# Patient Record
Sex: FEMALE | Race: WHITE | Employment: FULL TIME | URBAN - METROPOLITAN AREA
[De-identification: names, ages, dates, MRNs, and addresses within clinical notes are randomized per-mention and may not be internally consistent; named-entity substitution may affect disease eponyms.]

---

## 2020-07-01 VITALS
BODY MASS INDEX: 34.91 KG/M2 | SYSTOLIC BLOOD PRESSURE: 148 MMHG | WEIGHT: 197 LBS | HEIGHT: 63 IN | DIASTOLIC BLOOD PRESSURE: 100 MMHG

## 2020-07-01 PROBLEM — Z87.410 HISTORY OF CERVICAL DYSPLASIA: Status: ACTIVE | Noted: 2020-07-01

## 2020-07-01 PROBLEM — E07.9 DISORDER OF THYROID: Status: ACTIVE | Noted: 2020-07-01

## 2020-07-01 PROBLEM — G43.909 MIGRAINE: Status: ACTIVE | Noted: 2020-07-01

## 2020-07-01 PROBLEM — E66.9 OBESITY: Status: ACTIVE | Noted: 2020-07-01

## 2020-07-01 PROBLEM — E78.00 HYPERCHOLESTEROLEMIA: Status: ACTIVE | Noted: 2020-07-01

## 2020-07-01 RX ORDER — RIZATRIPTAN BENZOATE 10 MG/1
10 TABLET ORAL
COMMUNITY
End: 2022-07-13 | Stop reason: SDUPTHER

## 2020-07-01 RX ORDER — LEVOTHYROXINE SODIUM 112 UG/1
TABLET ORAL
COMMUNITY

## 2020-07-01 RX ORDER — SIMVASTATIN 20 MG/1
TABLET, FILM COATED ORAL
COMMUNITY

## 2021-07-01 VITALS — HEIGHT: 63 IN | BODY MASS INDEX: 34.9 KG/M2

## 2021-07-08 ENCOUNTER — OFFICE VISIT (OUTPATIENT)
Dept: OBGYN CLINIC | Age: 61
End: 2021-07-08
Payer: COMMERCIAL

## 2021-07-08 VITALS
WEIGHT: 203 LBS | BODY MASS INDEX: 35.97 KG/M2 | SYSTOLIC BLOOD PRESSURE: 134 MMHG | HEIGHT: 63 IN | DIASTOLIC BLOOD PRESSURE: 82 MMHG

## 2021-07-08 DIAGNOSIS — Z01.419 ROUTINE GYNECOLOGICAL EXAMINATION: ICD-10-CM

## 2021-07-08 DIAGNOSIS — N81.11 CYSTOCELE, MIDLINE: ICD-10-CM

## 2021-07-08 DIAGNOSIS — N95.1 MENOPAUSAL SYNDROME: ICD-10-CM

## 2021-07-08 DIAGNOSIS — Z12.4 SCREENING FOR MALIGNANT NEOPLASM OF CERVIX: Primary | ICD-10-CM

## 2021-07-08 PROCEDURE — 99396 PREV VISIT EST AGE 40-64: CPT | Performed by: OBSTETRICS & GYNECOLOGY

## 2021-07-08 NOTE — PROGRESS NOTES
Chief Complaint   [facility] with   [facility]     Gaojg-0-/scheduled 7-28-21, Colonoscopy-, [id number]     Visit Vitals  /82 (BP 1 Location: Left upper arm, BP Patient Position: Sitting, BP Cuff Size: Small adult)   Ht 5' 3\" (1.6 m)   Wt 203 lb (92.1 kg)   BMI 35.96 kg/m²

## 2021-07-08 NOTE — PROGRESS NOTES
Henri Fuentes is a , 61 y.o. female   No LMP recorded. Patient is postmenopausal.    She presents for her annual    She is having notes some vaginal pressure, no UI sxs. Menstrual status:  Cycles are menopausal.    Flow: absent. She does not have dysmenorrhea. Medical conditions:  Since her last annual GYN exam about one year ago, she has not the following changes in her health history: none. Mammogram History:    EDUARD Results (most recent):  No results found for this or any previous visit. DEXA Results (most recent):  No results found for this or any previous visit. Past Medical History:   Diagnosis Date    Abnormal Papanicolaou smear of cervix     Disorder of thyroid 2020    History of cervical dysplasia 2020    Hypercholesterolemia 2020    Migraine 2020    Obesity 2020     Past Surgical History:   Procedure Laterality Date    HX BILATERAL SALPINGECTOMY      HX COLONOSCOPY      HX COLPOSCOPY      HX GI  10/2020    colonoscopy    HX GYN      CRYOSURGERY OF CX    IR CHOLECYSTOSTOMY PERCUTANEOUS      [place] LAP,TUBAL CAUTERY         Prior to Admission medications    Medication Sig Start Date End Date Taking? Authorizing Provider   levothyroxine (SYNTHROID) 112 mcg tablet Take  by mouth Daily (before breakfast). Yes Provider, Historical   rizatriptan (MAXALT) 10 mg tablet Take 10 mg by mouth once as needed for Migraine. May repeat in 2 hours if needed   Yes Provider, Historical   simvastatin (ZOCOR) 20 mg tablet Take  by mouth nightly. Yes Provider, Historical   VALSARTAN PO Take  by mouth. Yes Provider, Historical       Allergies   Allergen Reactions    Sulfa (Sulfonamide Antibiotics) Rash          Tobacco History:  reports that she has never smoked. She has never used smokeless tobacco.  Alcohol Abuse:  reports previous alcohol use. Drug Abuse:  reports no history of drug use.     Family Medical/Cancer History:   Family History   Problem Relation Age of Onset    Diabetes Other     Lung Disease Other           Review of Systems   Constitutional: Negative for chills, fever, malaise/fatigue and weight loss. HENT: Negative for congestion, ear pain, sinus pain and tinnitus. Eyes: Negative for blurred vision and double vision. Respiratory: Negative for cough, shortness of breath and wheezing. Cardiovascular: Negative for chest pain and palpitations. Gastrointestinal: Negative for abdominal pain, blood in stool, constipation, diarrhea, heartburn, nausea and vomiting. Genitourinary: Negative for dysuria, flank pain, frequency, hematuria and urgency. Musculoskeletal: Negative for joint pain and myalgias. Skin: Negative for itching and rash. Neurological: Negative for dizziness, weakness and headaches. Psychiatric/Behavioral: Negative for depression, memory loss and suicidal ideas. The patient is not nervous/anxious and does not have insomnia. Physical Exam  Constitutional:       Appearance: Normal appearance. HENT:      Head: Normocephalic and atraumatic. Cardiovascular:      Rate and Rhythm: Normal rate. Heart sounds: Normal heart sounds. Pulmonary:      Effort: Pulmonary effort is normal.      Breath sounds: Normal breath sounds. Chest:      Breasts:         Right: Normal.         Left: Normal.   Abdominal:      General: Abdomen is flat. Palpations: Abdomen is soft. Genitourinary:     General: Normal vulva. Vagina: Prolapsed vaginal walls present. Cervix: Normal.      Uterus: Normal.       Adnexa: Right adnexa normal and left adnexa normal.      Rectum: Normal.      Comments: PAP Obtained  Midline cystocele  Neurological:      Mental Status: She is alert. Psychiatric:         Mood and Affect: Mood normal.         Behavior: Behavior normal.         Thought Content:  Thought content normal.          Visit Vitals  /82 (BP 1 Location: Left upper arm, BP Patient Position: Sitting, BP Cuff Size: Small adult)   Ht 5' 3\" (1.6 m)   Wt 203 lb (92.1 kg)   BMI 35.96 kg/m²         Assessment:   Diagnoses and all orders for this visit:    1. Screening for malignant neoplasm of cervix  -     PAP IG, RFX APTIMA HPV ASCUS ([id number])    2. Routine gynecological examination  -     PAP IG, RFX APTIMA HPV ASCUS ([id number])    3. Menopausal syndrome    4. Cystocele, midline    Kegel's info given to the pt    Plan: Questions addressed  Counseled re: diet, exercise, healthy lifestyle  Return for Annual  Rec annual mammogram      Follow-up and Dispositions    · Return for 1 yr annual, 1 yr mammo.

## 2021-07-10 LAB
CYTOLOGIST CVX/VAG CYTO: NORMAL
CYTOLOGY CVX/VAG DOC CYTO: NORMAL
CYTOLOGY CVX/VAG DOC THIN PREP: NORMAL
DX ICD CODE: NORMAL
LABCORP, 190119: NORMAL
Lab: NORMAL
OTHER STN SPEC: NORMAL
STAT OF ADQ CVX/VAG CYTO-IMP: NORMAL

## 2021-07-21 DIAGNOSIS — Z12.31 SCREENING MAMMOGRAM, ENCOUNTER FOR: Primary | ICD-10-CM

## 2021-08-16 DIAGNOSIS — Z12.31 SCREENING MAMMOGRAM, ENCOUNTER FOR: ICD-10-CM

## 2022-03-18 PROBLEM — Z87.410 HISTORY OF CERVICAL DYSPLASIA: Status: ACTIVE | Noted: 2020-07-01

## 2022-03-18 PROBLEM — E78.00 HYPERCHOLESTEROLEMIA: Status: ACTIVE | Noted: 2020-07-01

## 2022-03-19 PROBLEM — E66.9 OBESITY: Status: ACTIVE | Noted: 2020-07-01

## 2022-03-19 PROBLEM — G43.909 MIGRAINE: Status: ACTIVE | Noted: 2020-07-01

## 2022-03-19 PROBLEM — E07.9 DISORDER OF THYROID: Status: ACTIVE | Noted: 2020-07-01

## 2022-04-13 ENCOUNTER — OFFICE VISIT (OUTPATIENT)
Dept: OBGYN CLINIC | Age: 62
End: 2022-04-13
Payer: COMMERCIAL

## 2022-04-13 VITALS
WEIGHT: 201.13 LBS | HEIGHT: 63 IN | DIASTOLIC BLOOD PRESSURE: 74 MMHG | SYSTOLIC BLOOD PRESSURE: 126 MMHG | BODY MASS INDEX: 35.64 KG/M2

## 2022-04-13 DIAGNOSIS — N81.2 CYSTOCELE WITH SECOND DEGREE UTERINE PROLAPSE: Primary | ICD-10-CM

## 2022-04-13 PROCEDURE — 99214 OFFICE O/P EST MOD 30 MIN: CPT | Performed by: OBSTETRICS & GYNECOLOGY

## 2022-04-13 NOTE — PROGRESS NOTES
Fletcher Powell is a , 64 y.o. female   No LMP recorded. Patient is postmenopausal.    She presents for her problem    She is having increased pelvic pressure with a vaginal bulge noted, worse as day goes on or with inc. abdominal pressure. Menstrual status:  Cycles are menopausal.    Flow: absent. She does not have dysmenorrhea. Medical conditions:  Since her last annual GYN exam about one year ago, she has not the following changes in her health history: none. Mammogram History:    EDUARD Results (most recent):  Results from Orders Only encounter on 21    EDUARD 3D JESSENIA W MAMMO BI SCREENING INCL CAD       DEXA Results (most recent):  No results found for this or any previous visit. Past Medical History:   Diagnosis Date    Abnormal Papanicolaou smear of cervix     Disorder of thyroid 2020    History of cervical dysplasia 2020    Hypercholesterolemia 2020    Migraine 2020    Obesity 2020     Past Surgical History:   Procedure Laterality Date    HX BILATERAL SALPINGECTOMY      HX COLONOSCOPY      HX COLPOSCOPY      HX GI  10/2020    colonoscopy    HX GYN      CRYOSURGERY OF CX    IR CHOLECYSTOSTOMY PERCUTANEOUS      [place] LAP,TUBAL CAUTERY         Prior to Admission medications    Medication Sig Start Date End Date Taking? Authorizing Provider   levothyroxine (SYNTHROID) 112 mcg tablet Take  by mouth Daily (before breakfast). Yes Provider, Historical   rizatriptan (MAXALT) 10 mg tablet Take 10 mg by mouth once as needed for Migraine. May repeat in 2 hours if needed   Yes Provider, Historical   simvastatin (ZOCOR) 20 mg tablet Take  by mouth nightly. Yes Provider, Historical   VALSARTAN PO Take  by mouth. Yes Provider, Historical       Allergies   Allergen Reactions    Sulfa (Sulfonamide Antibiotics) Rash          Tobacco History:  reports that she has never smoked.  She has never used smokeless tobacco.  Alcohol use:  reports previous alcohol use.  Drug use:  reports no history of drug use. Family Medical/Cancer History:   Family History   Problem Relation Age of Onset    Diabetes Other     Lung Disease Other           Review of Systems   Constitutional: Negative for chills, fever, malaise/fatigue and weight loss. HENT: Negative for congestion, ear pain, sinus pain and tinnitus. Eyes: Negative for blurred vision and double vision. Respiratory: Negative for cough, shortness of breath and wheezing. Cardiovascular: Negative for chest pain and palpitations. Gastrointestinal: Negative for abdominal pain, blood in stool, constipation, diarrhea, heartburn, nausea and vomiting. Genitourinary: Negative for dysuria, flank pain, frequency, hematuria and urgency. Musculoskeletal: Negative for joint pain and myalgias. Skin: Negative for itching and rash. Neurological: Negative for dizziness, weakness and headaches. Psychiatric/Behavioral: Negative for depression, memory loss and suicidal ideas. The patient is not nervous/anxious and does not have insomnia. Physical Exam  Constitutional:       Appearance: Normal appearance. HENT:      Head: Normocephalic and atraumatic. Abdominal:      General: Abdomen is flat. Palpations: Abdomen is soft. Genitourinary:     General: Normal vulva. Vagina: Prolapsed vaginal walls present. Cervix: Normal.      Uterus: Normal. With uterine prolapse. Adnexa: Right adnexa normal and left adnexa normal.      Rectum: Normal.      Comments: No PAP obtained  Neurological:      Mental Status: She is alert. Psychiatric:         Mood and Affect: Mood normal.         Behavior: Behavior normal.         Thought Content:  Thought content normal.          Visit Vitals  /74 (BP 1 Location: Left upper arm, BP Patient Position: Sitting, BP Cuff Size: Large adult)   Ht 5' 3\" (1.6 m)   Wt 201 lb 2 oz (91.2 kg)   BMI 35.63 kg/m²         Assessment: Diagnoses and all orders for this visit: 1. Cystocele with second degree uterine prolapse    Anatomy DWP, options DWP- nothing, Pessary or LAVH/Anterior repair    Plan: Questions addressed, Info given to the pt.  Pt to call with desired option  Counseled re: diet, exercise, healthy lifestyle  Return for Annual  Rec annual mammogram

## 2022-04-13 NOTE — PROGRESS NOTES
Chief Complaint   Patient presents with    Vaginal Pain     Visit Vitals  /74 (BP 1 Location: Left upper arm, BP Patient Position: Sitting, BP Cuff Size: Large adult)   Ht 5' 3\" (1.6 m)   Wt 201 lb 2 oz (91.2 kg)   BMI 35.63 kg/m²

## 2022-07-13 ENCOUNTER — OFFICE VISIT (OUTPATIENT)
Dept: OBGYN CLINIC | Age: 62
End: 2022-07-13
Payer: COMMERCIAL

## 2022-07-13 VITALS
HEIGHT: 63 IN | BODY MASS INDEX: 35.08 KG/M2 | SYSTOLIC BLOOD PRESSURE: 122 MMHG | WEIGHT: 198 LBS | DIASTOLIC BLOOD PRESSURE: 72 MMHG

## 2022-07-13 DIAGNOSIS — Z01.419 ROUTINE GYNECOLOGICAL EXAMINATION: ICD-10-CM

## 2022-07-13 DIAGNOSIS — N95.1 MENOPAUSAL SYNDROME: ICD-10-CM

## 2022-07-13 DIAGNOSIS — Z12.4 SCREENING FOR MALIGNANT NEOPLASM OF CERVIX: Primary | ICD-10-CM

## 2022-07-13 DIAGNOSIS — N81.4 PELVIC RELAXATION DUE TO UTERINE PROLAPSE: ICD-10-CM

## 2022-07-13 PROCEDURE — 99396 PREV VISIT EST AGE 40-64: CPT | Performed by: OBSTETRICS & GYNECOLOGY

## 2022-07-13 RX ORDER — RIZATRIPTAN BENZOATE 10 MG/1
TABLET ORAL
Qty: 30 TABLET | Refills: 1 | Status: SHIPPED | OUTPATIENT
Start: 2022-07-13

## 2022-07-13 NOTE — PROGRESS NOTES
Chief Complaint   Patient presents with    Annual Exam     mammo 7-2021     Visit Vitals  /72 (BP 1 Location: Left upper arm, BP Patient Position: Sitting, BP Cuff Size: Small adult)   Ht 5' 3\" (1.6 m)   Wt 198 lb (89.8 kg)   BMI 35.07 kg/m²

## 2022-07-13 NOTE — PROGRESS NOTES
Alie Katz is a , 64 y.o. female   No LMP recorded. Patient is postmenopausal.    She presents for her annual    She is having no significant problems. Notes increasing pelvic discomfort and pressure      Menstrual status:  Cycles are menopausal.    Flow: absent. She does not have dysmenorrhea. Medical conditions:  Since her last annual GYN exam about one year ago, she has not the following changes in her health history: none. Mammogram History:    EDUARD Results (most recent):  Results from Orders Only encounter on 21    EDUARD 3D JESSENIA W MAMMO BI SCREENING INCL CAD       DEXA Results (most recent):  No results found for this or any previous visit. Past Medical History:   Diagnosis Date    Abnormal Papanicolaou smear of cervix     Disorder of thyroid 2020    History of cervical dysplasia 2020    Hypercholesterolemia 2020    Migraine 2020    Obesity 2020     Past Surgical History:   Procedure Laterality Date    HX BILATERAL SALPINGECTOMY      HX COLONOSCOPY      HX COLPOSCOPY      HX GI  10/2020    colonoscopy    HX GYN      CRYOSURGERY OF CX    IR CHOLECYSTOSTOMY PERCUTANEOUS      [place] LAP,TUBAL CAUTERY         Prior to Admission medications    Medication Sig Start Date End Date Taking? Authorizing Provider   rizatriptan (MAXALT) 10 mg tablet Take 1 tablet by mouth as needed, may repeat in 2 hours if needed  Indications: a migraine headache 22  Yes Diane Portillo MD   levothyroxine (SYNTHROID) 112 mcg tablet Take  by mouth Daily (before breakfast). Yes Provider, Historical   simvastatin (ZOCOR) 20 mg tablet Take  by mouth nightly. Yes Provider, Historical   VALSARTAN PO Take  by mouth. Yes Provider, Historical       Allergies   Allergen Reactions    Sulfa (Sulfonamide Antibiotics) Rash          Tobacco History:  reports that she has never smoked. She has never used smokeless tobacco.  Alcohol use:  reports previous alcohol use.   Drug use:  reports no history of drug use. Family Medical/Cancer History:   Family History   Problem Relation Age of Onset    Diabetes Other     Lung Disease Other           Review of Systems   Constitutional: Negative for chills, fever, malaise/fatigue and weight loss. HENT: Negative for congestion, ear pain, sinus pain and tinnitus. Eyes: Negative for blurred vision and double vision. Respiratory: Negative for cough, shortness of breath and wheezing. Cardiovascular: Negative for chest pain and palpitations. Gastrointestinal: Negative for abdominal pain, blood in stool, constipation, diarrhea, heartburn, nausea and vomiting. Genitourinary: Negative for dysuria, flank pain, frequency, hematuria and urgency. Musculoskeletal: Negative for joint pain and myalgias. Skin: Negative for itching and rash. Neurological: Negative for dizziness, weakness and headaches. Psychiatric/Behavioral: Negative for depression, memory loss and suicidal ideas. The patient is not nervous/anxious and does not have insomnia. Physical Exam  Constitutional:       Appearance: Normal appearance. HENT:      Head: Normocephalic and atraumatic. Cardiovascular:      Rate and Rhythm: Normal rate. Heart sounds: Normal heart sounds. Pulmonary:      Effort: Pulmonary effort is normal.      Breath sounds: Normal breath sounds. Chest:   Breasts:      Right: Normal.      Left: Normal.       Abdominal:      General: Abdomen is flat. Palpations: Abdomen is soft. Genitourinary:     General: Normal vulva. Vagina: Prolapsed vaginal walls present. Cervix: Normal.      Uterus: Normal. With uterine prolapse. Adnexa: Right adnexa normal and left adnexa normal.      Rectum: Normal.      Comments: PAP Obtained  Neurological:      Mental Status: She is alert. Psychiatric:         Mood and Affect: Mood normal.         Behavior: Behavior normal.         Thought Content:  Thought content normal. Visit Vitals  /72 (BP 1 Location: Left upper arm, BP Patient Position: Sitting, BP Cuff Size: Small adult)   Ht 5' 3\" (1.6 m)   Wt 198 lb (89.8 kg)   BMI 35.07 kg/m²         Assessment:   Diagnoses and all orders for this visit:    1. Screening for malignant neoplasm of cervix  -     PAP IG, RFX APTIMA HPV ASCUS ([id number])    2. Routine gynecological examination  -     PAP IG, RFX APTIMA HPV ASCUS ([id number])    3. Menopausal syndrome    4. Pelvic relaxation due to uterine prolapse    Other orders  -     rizatriptan (MAXALT) 10 mg tablet; Take 1 tablet by mouth as needed, may repeat in 2 hours if needed  Indications: a migraine headache    Options DWP- regarding prolapse- nothing, pessary, or Surgery  Pt to think about and will call back- will send to Urogyn given prolapse    Plan: Questions addressed  Counseled re: diet, exercise, healthy lifestyle  Return for Annual  Rec annual mammogram        Follow-up and Dispositions    · Return for Mammogram, 1 yr annual, 1 yr mammo.

## 2022-07-20 DIAGNOSIS — N81.89 PELVIC RELAXATION: Primary | ICD-10-CM

## 2022-07-20 DIAGNOSIS — Z12.31 SCREENING MAMMOGRAM, ENCOUNTER FOR: Primary | ICD-10-CM

## 2022-08-03 DIAGNOSIS — Z12.31 SCREENING MAMMOGRAM, ENCOUNTER FOR: ICD-10-CM

## 2023-05-21 RX ORDER — LEVOTHYROXINE SODIUM 112 UG/1
TABLET ORAL
COMMUNITY

## 2023-05-21 RX ORDER — SIMVASTATIN 20 MG
TABLET ORAL
COMMUNITY

## 2023-05-21 RX ORDER — RIZATRIPTAN BENZOATE 10 MG/1
TABLET ORAL
COMMUNITY
Start: 2022-07-13

## 2023-07-14 VITALS — BODY MASS INDEX: 35.07 KG/M2 | HEIGHT: 63 IN

## 2023-07-20 ENCOUNTER — OFFICE VISIT (OUTPATIENT)
Age: 63
End: 2023-07-20

## 2023-07-20 VITALS
BODY MASS INDEX: 34.22 KG/M2 | DIASTOLIC BLOOD PRESSURE: 72 MMHG | HEIGHT: 63 IN | WEIGHT: 193.13 LBS | SYSTOLIC BLOOD PRESSURE: 128 MMHG

## 2023-07-20 DIAGNOSIS — Z12.4 PAP SMEAR FOR CERVICAL CANCER SCREENING: ICD-10-CM

## 2023-07-20 DIAGNOSIS — Z01.419 GYNECOLOGIC EXAM NORMAL: ICD-10-CM

## 2023-07-20 DIAGNOSIS — N81.4 PELVIC RELAXATION DUE TO UTERINE PROLAPSE: ICD-10-CM

## 2023-07-20 DIAGNOSIS — N95.9 MENOPAUSAL PROBLEM: Primary | ICD-10-CM

## 2023-07-20 ASSESSMENT — ENCOUNTER SYMPTOMS
GASTROINTESTINAL NEGATIVE: 1
RESPIRATORY NEGATIVE: 1

## 2023-07-20 NOTE — PROGRESS NOTES
Toney Dave is a [id number], 58 y.o. female   No LMP recorded. (Menstrual status: Menopause). She presents for her annual    She is having no significant problems. Menstrual status:  Cycles are menopausal.    Flow: absent. She does not have dysmenorrhea. Medical conditions:  Since her last annual GYN exam about one year ago, she has not the following changes in her health history: none. Mammogram History:    JUDY Results (most recent):  @SwarmBuildILASTIMGCAT(AMX0422:1)@     DEXA Results (most recent):  @BSILASTIMGCAT(QVW5078:1)@       Past Medical History:   Diagnosis Date    Abnormal Papanicolaou smear of cervix     Disorder of thyroid 7/1/2020    History of cervical dysplasia 7/1/2020    Hypercholesterolemia 7/1/2020    Migraine 7/1/2020    Obesity 7/1/2020     Past Surgical History:   Procedure Laterality Date    COLONOSCOPY      COLPOSCOPY      GI  10/2020    colonoscopy    GYN      CRYOSURGERY OF CX    IR CHOLECYSTOSTOMY PERCUTANEOUS COMPLETE      LAP,TUBAL CAUTERY      SALPINGECTOMY         Prior to Admission medications    Medication Sig Start Date End Date Taking? Authorizing Provider   VALSARTAN PO Take by mouth   Yes Ar Automatic Reconciliation   levothyroxine (SYNTHROID) 112 MCG tablet Take by mouth every morning (before breakfast)   Yes Ar Automatic Reconciliation   rizatriptan (MAXALT) 10 MG tablet Take 1 tablet by mouth as needed, may repeat in 2 hours if needed  Indications: a migraine headache 7/13/22  Yes Ar Automatic Reconciliation   simvastatin (ZOCOR) 20 MG tablet Take by mouth   Yes Ar Automatic Reconciliation       Allergies   Allergen Reactions    Sulfa Antibiotics Rash          Tobacco History:  reports that she has never smoked. She has never used smokeless tobacco.  Alcohol use:  reports that she does not currently use alcohol. Drug use:  reports no history of drug use.     Family Medical/Cancer History:   Family History   Problem Relation Age of Onset    Diabetes Other

## 2023-07-22 LAB
., LABCORP: NORMAL
CYTOLOGIST CVX/VAG CYTO: NORMAL
CYTOLOGY CVX/VAG DOC CYTO: NORMAL
CYTOLOGY CVX/VAG DOC THIN PREP: NORMAL
DX ICD CODE: NORMAL
Lab: NORMAL
OTHER STN SPEC: NORMAL
STAT OF ADQ CVX/VAG CYTO-IMP: NORMAL

## 2023-07-25 DIAGNOSIS — Z12.31 SCREENING MAMMOGRAM, ENCOUNTER FOR: Primary | ICD-10-CM

## 2023-07-25 DIAGNOSIS — G43.909 MIGRAINE WITHOUT STATUS MIGRAINOSUS, NOT INTRACTABLE, UNSPECIFIED MIGRAINE TYPE: Primary | ICD-10-CM

## 2023-07-25 RX ORDER — RIZATRIPTAN BENZOATE 10 MG/1
TABLET ORAL
Qty: 30 TABLET | Refills: 1 | Status: SHIPPED | OUTPATIENT
Start: 2023-07-25

## 2023-08-04 DIAGNOSIS — N95.9 MENOPAUSAL PROBLEM: ICD-10-CM

## 2023-08-07 ENCOUNTER — TELEPHONE (OUTPATIENT)
Age: 63
End: 2023-08-07

## 2023-08-07 NOTE — TELEPHONE ENCOUNTER
DEXA: Normal results  Stressed Ca/ Vit.  D/ Dec. Fall risks  NA and no VM  MyChart message sent to the pt

## 2023-08-10 DIAGNOSIS — Z12.31 SCREENING MAMMOGRAM, ENCOUNTER FOR: ICD-10-CM

## 2024-07-24 ENCOUNTER — OFFICE VISIT (OUTPATIENT)
Age: 64
End: 2024-07-24
Payer: COMMERCIAL

## 2024-07-24 VITALS
BODY MASS INDEX: 34.42 KG/M2 | WEIGHT: 194.25 LBS | DIASTOLIC BLOOD PRESSURE: 74 MMHG | HEIGHT: 63 IN | SYSTOLIC BLOOD PRESSURE: 116 MMHG

## 2024-07-24 DIAGNOSIS — Z12.4 PAP SMEAR FOR CERVICAL CANCER SCREENING: Primary | ICD-10-CM

## 2024-07-24 DIAGNOSIS — Z01.419 GYNECOLOGIC EXAM NORMAL: ICD-10-CM

## 2024-07-24 DIAGNOSIS — G43.909 MIGRAINE WITHOUT STATUS MIGRAINOSUS, NOT INTRACTABLE, UNSPECIFIED MIGRAINE TYPE: ICD-10-CM

## 2024-07-24 DIAGNOSIS — Z12.31 SCREENING MAMMOGRAM FOR BREAST CANCER: ICD-10-CM

## 2024-07-24 DIAGNOSIS — N81.4 PELVIC RELAXATION DUE TO UTERINE PROLAPSE: ICD-10-CM

## 2024-07-24 DIAGNOSIS — N95.9 MENOPAUSAL PROBLEM: ICD-10-CM

## 2024-07-24 DIAGNOSIS — N95.1 MENOPAUSAL SYNDROME: ICD-10-CM

## 2024-07-24 PROCEDURE — 99396 PREV VISIT EST AGE 40-64: CPT | Performed by: OBSTETRICS & GYNECOLOGY

## 2024-07-24 RX ORDER — RIZATRIPTAN BENZOATE 10 MG/1
TABLET ORAL
Qty: 30 TABLET | Refills: 1 | Status: SHIPPED | OUTPATIENT
Start: 2024-07-24

## 2024-07-24 ASSESSMENT — ENCOUNTER SYMPTOMS
RESPIRATORY NEGATIVE: 1
GASTROINTESTINAL NEGATIVE: 1

## 2024-07-24 NOTE — PROGRESS NOTES
Chief Complaint   Patient presents with    Annual Exam     [id number]  Dexa-uncertain. May have been last year. She will check. Order given in case she needs it     /74 (Site: Right Upper Arm, Position: Sitting, Cuff Size: Small Adult)   Ht 1.6 m (5' 3\")   Wt 88.1 kg (194 lb 4 oz)   BMI 34.41 kg/m²

## 2024-07-24 NOTE — PROGRESS NOTES
Olivier Stone is a , 63 y.o. female   No LMP recorded. (Menstrual status: Menopause).    She presents for her annual    She is having no significant problems.      Menstrual status:  Cycles are menopausal.    Flow: absent.      She does not have dysmenorrhea.      Medical conditions:  Since her last annual GYN exam about one year ago, she has not the following changes in her health history: none.     Mammogram History:    JUDY Results (most recent):  @BSILASTIMGCAT(QFV1744:1)@     DEXA Results (most recent):  @BSHSILASTIMGCAT(QHK8560:1)@       Past Medical History:   Diagnosis Date    Abnormal Papanicolaou smear of cervix     Disorder of thyroid 2020    History of cervical dysplasia 2020    Hypercholesterolemia 2020    Migraine 2020    Obesity 2020     Past Surgical History:   Procedure Laterality Date    COLONOSCOPY      COLPOSCOPY      GI  10/2020    colonoscopy    GYN      CRYOSURGERY OF CX    IR CHOLECYSTOSTOMY PERCUTANEOUS COMPLETE      LAP,TUBAL CAUTERY      SALPINGECTOMY         Prior to Admission medications    Medication Sig Start Date End Date Taking? Authorizing Provider   rizatriptan (MAXALT) 10 MG tablet Take 1 tablet by mouth as needed.  May repeat in 2 hours if needed 24  Yes Nasim Watt MD   VALSARTAN PO Take by mouth   Yes Automatic Reconciliation, Ar   levothyroxine (SYNTHROID) 112 MCG tablet Take by mouth every morning (before breakfast)   Yes Automatic Reconciliation, Ar   simvastatin (ZOCOR) 20 MG tablet Take by mouth   Yes Automatic Reconciliation, Ar       Allergies   Allergen Reactions    Sulfa Antibiotics Rash          Tobacco History:  reports that she has never smoked. She has never used smokeless tobacco.  Alcohol use:  reports that she does not currently use alcohol.  Drug use:  reports no history of drug use.    Family Medical/Cancer History:   Family History   Problem Relation Age of Onset    Diabetes Other     Lung Disease Other         Review

## 2024-08-06 DIAGNOSIS — Z12.31 SCREENING MAMMOGRAM FOR BREAST CANCER: ICD-10-CM

## 2025-08-07 DIAGNOSIS — G43.909 MIGRAINE WITHOUT STATUS MIGRAINOSUS, NOT INTRACTABLE, UNSPECIFIED MIGRAINE TYPE: ICD-10-CM

## 2025-08-07 RX ORDER — RIZATRIPTAN BENZOATE 10 MG/1
TABLET ORAL
Qty: 9 TABLET | Refills: 0 | Status: SHIPPED | OUTPATIENT
Start: 2025-08-07

## 2025-08-08 LAB — MAMMOGRAPHY, EXTERNAL: NORMAL

## 2025-08-27 ENCOUNTER — OFFICE VISIT (OUTPATIENT)
Age: 65
End: 2025-08-27
Payer: MEDICARE

## 2025-08-27 VITALS
DIASTOLIC BLOOD PRESSURE: 74 MMHG | HEIGHT: 63 IN | WEIGHT: 194.25 LBS | BODY MASS INDEX: 34.42 KG/M2 | SYSTOLIC BLOOD PRESSURE: 112 MMHG

## 2025-08-27 DIAGNOSIS — Z01.419 GYNECOLOGIC EXAM NORMAL: ICD-10-CM

## 2025-08-27 DIAGNOSIS — G43.909 MIGRAINE WITHOUT STATUS MIGRAINOSUS, NOT INTRACTABLE, UNSPECIFIED MIGRAINE TYPE: ICD-10-CM

## 2025-08-27 DIAGNOSIS — N95.1 MENOPAUSAL SYNDROME: ICD-10-CM

## 2025-08-27 DIAGNOSIS — N81.4 PELVIC RELAXATION DUE TO UTERINE PROLAPSE: ICD-10-CM

## 2025-08-27 DIAGNOSIS — Z12.4 PAP SMEAR FOR CERVICAL CANCER SCREENING: Primary | ICD-10-CM

## 2025-08-27 PROCEDURE — G0101 CA SCREEN;PELVIC/BREAST EXAM: HCPCS | Performed by: OBSTETRICS & GYNECOLOGY

## 2025-08-27 RX ORDER — RIZATRIPTAN BENZOATE 10 MG/1
TABLET ORAL
Qty: 9 TABLET | Refills: 5 | Status: SHIPPED | OUTPATIENT
Start: 2025-08-27

## 2025-08-27 ASSESSMENT — ENCOUNTER SYMPTOMS
GASTROINTESTINAL NEGATIVE: 1
RESPIRATORY NEGATIVE: 1

## 2025-08-29 LAB
., LABCORP: NORMAL
CYTOLOGIST CVX/VAG CYTO: NORMAL
CYTOLOGY CVX/VAG DOC CYTO: NORMAL
CYTOLOGY CVX/VAG DOC THIN PREP: NORMAL
DX ICD CODE: NORMAL
OTHER STN SPEC: NORMAL
SERVICE CMNT-IMP: NORMAL
STAT OF ADQ CVX/VAG CYTO-IMP: NORMAL